# Patient Record
Sex: FEMALE | Race: OTHER | HISPANIC OR LATINO | URBAN - METROPOLITAN AREA
[De-identification: names, ages, dates, MRNs, and addresses within clinical notes are randomized per-mention and may not be internally consistent; named-entity substitution may affect disease eponyms.]

---

## 2024-07-29 ENCOUNTER — EMERGENCY (EMERGENCY)
Facility: HOSPITAL | Age: 24
LOS: 1 days | Discharge: HOME | End: 2024-07-29
Attending: EMERGENCY MEDICINE
Payer: SELF-PAY

## 2024-07-29 VITALS
OXYGEN SATURATION: 100 % | HEART RATE: 74 BPM | DIASTOLIC BLOOD PRESSURE: 73 MMHG | SYSTOLIC BLOOD PRESSURE: 99 MMHG | RESPIRATION RATE: 12 BRPM

## 2024-07-29 VITALS
DIASTOLIC BLOOD PRESSURE: 73 MMHG | HEART RATE: 80 BPM | WEIGHT: 121.25 LBS | OXYGEN SATURATION: 100 % | SYSTOLIC BLOOD PRESSURE: 113 MMHG | TEMPERATURE: 98 F | RESPIRATION RATE: 16 BRPM

## 2024-07-29 LAB
ALBUMIN SERPL ELPH-MCNC: 4.2 G/DL — SIGNIFICANT CHANGE UP (ref 3.5–5)
ALP SERPL-CCNC: 117 U/L — SIGNIFICANT CHANGE UP (ref 40–120)
ALT FLD-CCNC: 34 U/L DA — SIGNIFICANT CHANGE UP (ref 10–60)
AMPHET UR-MCNC: NEGATIVE — SIGNIFICANT CHANGE UP
ANION GAP SERPL CALC-SCNC: 8 MMOL/L — SIGNIFICANT CHANGE UP (ref 5–17)
APAP SERPL-MCNC: <10 UG/ML — SIGNIFICANT CHANGE UP (ref 10–30)
AST SERPL-CCNC: 20 U/L — SIGNIFICANT CHANGE UP (ref 10–40)
BARBITURATES UR SCN-MCNC: NEGATIVE — SIGNIFICANT CHANGE UP
BASE EXCESS BLDV CALC-SCNC: -1.1 MMOL/L — SIGNIFICANT CHANGE UP
BASOPHILS # BLD AUTO: 0.02 K/UL — SIGNIFICANT CHANGE UP (ref 0–0.2)
BASOPHILS NFR BLD AUTO: 0.2 % — SIGNIFICANT CHANGE UP (ref 0–2)
BENZODIAZ UR-MCNC: NEGATIVE — SIGNIFICANT CHANGE UP
BILIRUB SERPL-MCNC: 0.3 MG/DL — SIGNIFICANT CHANGE UP (ref 0.2–1.2)
BLOOD GAS COMMENTS, VENOUS: SIGNIFICANT CHANGE UP
BUN SERPL-MCNC: 8 MG/DL — SIGNIFICANT CHANGE UP (ref 7–18)
CA-I SERPL-SCNC: SIGNIFICANT CHANGE UP MMOL/L (ref 1.15–1.33)
CALCIUM SERPL-MCNC: 9 MG/DL — SIGNIFICANT CHANGE UP (ref 8.4–10.5)
CHLORIDE SERPL-SCNC: 111 MMOL/L — HIGH (ref 96–108)
CO2 SERPL-SCNC: 24 MMOL/L — SIGNIFICANT CHANGE UP (ref 22–31)
COCAINE METAB.OTHER UR-MCNC: NEGATIVE — SIGNIFICANT CHANGE UP
CREAT SERPL-MCNC: 0.65 MG/DL — SIGNIFICANT CHANGE UP (ref 0.5–1.3)
EGFR: 128 ML/MIN/1.73M2 — SIGNIFICANT CHANGE UP
EOSINOPHIL # BLD AUTO: 0.09 K/UL — SIGNIFICANT CHANGE UP (ref 0–0.5)
EOSINOPHIL NFR BLD AUTO: 0.8 % — SIGNIFICANT CHANGE UP (ref 0–6)
ETHANOL SERPL-MCNC: 245 MG/DL — HIGH (ref 0–10)
GAS PNL BLDV: 142 MMOL/L — SIGNIFICANT CHANGE UP (ref 136–145)
GAS PNL BLDV: SIGNIFICANT CHANGE UP
GLUCOSE SERPL-MCNC: 90 MG/DL — SIGNIFICANT CHANGE UP (ref 70–99)
HCG SERPL-ACNC: <1 MIU/ML — SIGNIFICANT CHANGE UP
HCO3 BLDV-SCNC: 25 MMOL/L — SIGNIFICANT CHANGE UP (ref 22–29)
HCT VFR BLD CALC: 35.5 % — SIGNIFICANT CHANGE UP (ref 34.5–45)
HGB BLD-MCNC: 11.9 G/DL — SIGNIFICANT CHANGE UP (ref 11.5–15.5)
IMM GRANULOCYTES NFR BLD AUTO: 0.3 % — SIGNIFICANT CHANGE UP (ref 0–0.9)
LACTATE BLDV-MCNC: 1.8 MMOL/L — SIGNIFICANT CHANGE UP (ref 0.5–2)
LACTATE SERPL-SCNC: 1.7 MMOL/L — SIGNIFICANT CHANGE UP (ref 0.7–2)
LYMPHOCYTES # BLD AUTO: 4.63 K/UL — HIGH (ref 1–3.3)
LYMPHOCYTES # BLD AUTO: 43.6 % — SIGNIFICANT CHANGE UP (ref 13–44)
MCHC RBC-ENTMCNC: 31.3 PG — SIGNIFICANT CHANGE UP (ref 27–34)
MCHC RBC-ENTMCNC: 33.5 GM/DL — SIGNIFICANT CHANGE UP (ref 32–36)
MCV RBC AUTO: 93.4 FL — SIGNIFICANT CHANGE UP (ref 80–100)
METHADONE UR-MCNC: NEGATIVE — SIGNIFICANT CHANGE UP
MONOCYTES # BLD AUTO: 0.52 K/UL — SIGNIFICANT CHANGE UP (ref 0–0.9)
MONOCYTES NFR BLD AUTO: 4.9 % — SIGNIFICANT CHANGE UP (ref 2–14)
NEUTROPHILS # BLD AUTO: 5.32 K/UL — SIGNIFICANT CHANGE UP (ref 1.8–7.4)
NEUTROPHILS NFR BLD AUTO: 50.2 % — SIGNIFICANT CHANGE UP (ref 43–77)
NRBC # BLD: 0 /100 WBCS — SIGNIFICANT CHANGE UP (ref 0–0)
OPIATES UR-MCNC: NEGATIVE — SIGNIFICANT CHANGE UP
PCO2 BLDV: 48 MMHG — HIGH (ref 39–42)
PCP SPEC-MCNC: SIGNIFICANT CHANGE UP
PCP UR-MCNC: NEGATIVE — SIGNIFICANT CHANGE UP
PH BLDV: 7.33 — SIGNIFICANT CHANGE UP (ref 7.32–7.43)
PLATELET # BLD AUTO: 343 K/UL — SIGNIFICANT CHANGE UP (ref 150–400)
PO2 BLDV: 31 MMHG — SIGNIFICANT CHANGE UP
POTASSIUM BLDV-SCNC: 4 MMOL/L — SIGNIFICANT CHANGE UP (ref 3.5–5.1)
POTASSIUM SERPL-MCNC: 3.7 MMOL/L — SIGNIFICANT CHANGE UP (ref 3.5–5.3)
POTASSIUM SERPL-SCNC: 3.7 MMOL/L — SIGNIFICANT CHANGE UP (ref 3.5–5.3)
PROT SERPL-MCNC: 7.9 G/DL — SIGNIFICANT CHANGE UP (ref 6–8.3)
RBC # BLD: 3.8 M/UL — SIGNIFICANT CHANGE UP (ref 3.8–5.2)
RBC # FLD: 12 % — SIGNIFICANT CHANGE UP (ref 10.3–14.5)
SALICYLATES SERPL-MCNC: <1.7 MG/DL — LOW (ref 2.8–20)
SAO2 % BLDV: 47.5 % — SIGNIFICANT CHANGE UP
SARS-COV-2 RNA SPEC QL NAA+PROBE: SIGNIFICANT CHANGE UP
SODIUM SERPL-SCNC: 143 MMOL/L — SIGNIFICANT CHANGE UP (ref 135–145)
THC UR QL: NEGATIVE — SIGNIFICANT CHANGE UP
WBC # BLD: 10.61 K/UL — HIGH (ref 3.8–10.5)
WBC # FLD AUTO: 10.61 K/UL — HIGH (ref 3.8–10.5)

## 2024-07-29 PROCEDURE — 70450 CT HEAD/BRAIN W/O DYE: CPT | Mod: MC

## 2024-07-29 PROCEDURE — 80307 DRUG TEST PRSMV CHEM ANLYZR: CPT

## 2024-07-29 PROCEDURE — 71045 X-RAY EXAM CHEST 1 VIEW: CPT

## 2024-07-29 PROCEDURE — 93005 ELECTROCARDIOGRAM TRACING: CPT

## 2024-07-29 PROCEDURE — 71045 X-RAY EXAM CHEST 1 VIEW: CPT | Mod: 26

## 2024-07-29 PROCEDURE — 93010 ELECTROCARDIOGRAM REPORT: CPT

## 2024-07-29 PROCEDURE — 85025 COMPLETE CBC W/AUTO DIFF WBC: CPT

## 2024-07-29 PROCEDURE — 70450 CT HEAD/BRAIN W/O DYE: CPT | Mod: 26,MC

## 2024-07-29 PROCEDURE — 84702 CHORIONIC GONADOTROPIN TEST: CPT

## 2024-07-29 PROCEDURE — 99285 EMERGENCY DEPT VISIT HI MDM: CPT | Mod: 25

## 2024-07-29 PROCEDURE — 99053 MED SERV 10PM-8AM 24 HR FAC: CPT

## 2024-07-29 PROCEDURE — 84132 ASSAY OF SERUM POTASSIUM: CPT

## 2024-07-29 PROCEDURE — 99285 EMERGENCY DEPT VISIT HI MDM: CPT

## 2024-07-29 PROCEDURE — 82330 ASSAY OF CALCIUM: CPT

## 2024-07-29 PROCEDURE — 83605 ASSAY OF LACTIC ACID: CPT

## 2024-07-29 PROCEDURE — 80053 COMPREHEN METABOLIC PANEL: CPT

## 2024-07-29 PROCEDURE — 82803 BLOOD GASES ANY COMBINATION: CPT

## 2024-07-29 PROCEDURE — 96374 THER/PROPH/DIAG INJ IV PUSH: CPT

## 2024-07-29 PROCEDURE — 82962 GLUCOSE BLOOD TEST: CPT

## 2024-07-29 PROCEDURE — 84295 ASSAY OF SERUM SODIUM: CPT

## 2024-07-29 PROCEDURE — 36415 COLL VENOUS BLD VENIPUNCTURE: CPT

## 2024-07-29 PROCEDURE — 72125 CT NECK SPINE W/O DYE: CPT | Mod: 26,MC

## 2024-07-29 PROCEDURE — 72125 CT NECK SPINE W/O DYE: CPT | Mod: MC

## 2024-07-29 PROCEDURE — 87635 SARS-COV-2 COVID-19 AMP PRB: CPT

## 2024-07-29 RX ORDER — SODIUM CHLORIDE 0.9 % (FLUSH) 0.9 %
1000 SYRINGE (ML) INJECTION ONCE
Refills: 0 | Status: COMPLETED | OUTPATIENT
Start: 2024-07-29 | End: 2024-07-29

## 2024-07-29 RX ORDER — NALOXONE HYDROCHLORIDE 1 MG/ML
0.04 INJECTION PARENTERAL ONCE
Refills: 0 | Status: COMPLETED | OUTPATIENT
Start: 2024-07-29 | End: 2024-07-29

## 2024-07-29 RX ADMIN — Medication 1000 MILLILITER(S): at 05:45

## 2024-07-29 RX ADMIN — Medication 1000 MILLILITER(S): at 01:20

## 2024-07-29 RX ADMIN — Medication 1000 MILLILITER(S): at 02:20

## 2024-07-29 RX ADMIN — Medication 1000 MILLILITER(S): at 04:10

## 2024-07-29 RX ADMIN — Medication 1000 MILLILITER(S): at 04:54

## 2024-07-29 RX ADMIN — NALOXONE HYDROCHLORIDE 0.04 MILLIGRAM(S): 1 INJECTION PARENTERAL at 01:20

## 2024-07-29 RX ADMIN — Medication 1000 MILLILITER(S): at 03:10

## 2024-07-29 NOTE — ED ADULT NURSE NOTE - TEMPLATE
General FAMILY HISTORY:  No pertinent family history in first degree relatives  No pertinent family history in first degree relatives

## 2024-07-29 NOTE — ED PROVIDER NOTE - CLINICAL SUMMARY MEDICAL DECISION MAKING FREE TEXT BOX
24-year-old female presenting with unresponsiveness.  Intact respirations with no evidence of tongue biting, urinary incontinence or significant external trauma or drug abuse.  Plan to perform CT imaging of head, C-spine, check labs including alcohol level, tox screen and reassess. 24-year-old female presenting with unresponsiveness.  Intact respirations with no evidence of tongue biting, urinary incontinence or significant external trauma or drug abuse.  Symptoms may suggest excessive alcohol consumption, seizure, drug use among other causes.  Plan to perform CT imaging of head, C-spine, check labs including alcohol level, tox screen and reassess.

## 2024-07-29 NOTE — ED PROVIDER NOTE - PATIENT PORTAL LINK FT
You can access the FollowMyHealth Patient Portal offered by [facility] by registering at the following website: [email or web address]. By joining [facility]’s FollowMyHealth portal, you will also be able to view your health information using other applications (apps) compatible with our system.

## 2024-07-29 NOTE — ED PROVIDER NOTE - PROGRESS NOTE DETAILS
Name band;
Patient is fully awake and alert.  Ambulatory without difficulty.  Admits to drinking several beers at a bar.  States that she was picked up by her boyfriend and brother after exiting bar because individuals were following her.  States that she was not assaulted.  States that she was not raped.  Denies having any history of seizures.  Recalls going home but does not remember anything after taking a shower.  States she would like to go home.

## 2024-07-29 NOTE — ED ADULT NURSE NOTE - OBJECTIVE STATEMENT
Pt present to the ED along with brother with unresponsive with foaming  . Provider at bedside . Pt was drinking alcohol at bar earlier . Placed on cardio respiratory monitor .

## 2024-07-29 NOTE — ED PROVIDER NOTE - NSFOLLOWUPINSTRUCTIONS_ED_ALL_ED_FT
Abuso de alcohol    LO QUE NECESITA SABER:    ¿Qué es el abuso de alcohol?El abuso de alcohol significa que usted sam más de los límites diarios o semanales recomendados. Usted puede estar bebiendo alcohol regularmente o bebiendo grandes cantidades en un corto período (atracones de bebida). Usted sigue tomando, aunque esto le cause problemas legales, laborales o con edilberto relaciones.    ¿Qué necesito saber acerca de los límites recomendados de alcohol?Alka bebida se define patsy 12 onzas (oz) de cerveza, 5 onzas de vino o 1.5 onzas de licor, patsy el whisky.    Los hombres de 21 a 64 añosdeberían limitar el consumo de alcohol a 2 tragos al día. No tome más de 4 bebidas por día o más de 14 en alka semana.    Todos los hombres y las mujeres de 65 años o másdeberían limitar el consumo de alcohol a 1 trago por día. No tome más de 3 bebidas por día o más de 7 en alka semana. No consuma bebidas alcohólicas si está embarazada.  ¿Cuáles son los signos y síntomas del abuso de alcohol?    Pérdida de interés en edilberto actividades, trabajo y labor escolar    Esconder alcohol o beber en privado    Depresión o sentimiento de culpa por beber    Pensamientos constantes acerca del alcohol    Beber por la mañana para aliviar los efectos de la resaca    No poder controlar la cantidad que se monty    Inquietud, comportamiento errático y violento  ¿Qué problemas de mike puede causar el abuso del alcohol?    Cáncer en hígado, páncreas, estómago, colon, riñón o mamas    Derrame cerebral o ataque cardíaco    Enfermedad hepática, renal o pulmonar    Desmayos, pérdida de memoria, daño cerebral o demencia    Diabetes, problemas del sistema inmunológico o deficiencia de tiamina (vitamina B1)    Problemas para usted y camacho bebé si sam ashok el embarazo  ¿Cómo se trata el abuso del alcohol?El tratamiento lo va a ayudar a comprender la razón por la cual usted abusa del alcohol. Los consejeros y terapeutas le van a proveer apoyo y lo van a ayudar a encontrar formas de afrontamiento en vez de benitez. Usted podría necesitar tratamiento con internación para proveerle un ambiente controlado. Usted podría necesitar tratamiento ambulatorio después de que camacho tratamiento hospitalario haya sido completado.    La desintoxicaciónes un programa utilizado para eliminar el alcohol de camacho cuerpo. Ashok la desintoxicación se administran medicamentos para ayudar a evitar los síntomas de abstinencia que se presentan cuando se suspende el consumo de alcohol.    La terapia de intervención brevelo ayuda a pensar de manera diferente sobre camacho consumo de alcohol. Un médico lo ayuda a fijar metas para disminuir camacho consumo de bebidas alcohólicas. La terapia puede continuar después de que sale del hospital.    Suplementos vitamínicos, patsy B1, pueden ser necesarios. El consumo de alcohol puede dificultar la capacidad del organismo de absorber suficiente vitamina B1. Es posible que le den vitamina B1 si camacho nivel es bajo. También se administra para prevenir el daño cerebral por consumo de alcohol.  ¿Qué puedo hacer para manejar mi abuso de alcohol?    Trabaje con los médicos en los objetivos para beber menos.[place] puede ayudar a prevenir problemas de mike. Por ejemplo, puede empezar por planificar camacho consumo semanal de alcohol. Será más fácil benitez menos bebidas si planifica con antelación.    Cuando ryan alcohol, acompáñelo con comida.La comida evitará que el alcohol entre en camacho sistema demasiado rápido. Coma antes de benitez camacho primera bebida alcohólica.    Calcule con cuidado el tiempo de edilberto bebidas.No tome más de alka bebida por hora. [place] líquido, patsy agua, café o un refresco, entre las bebidas alcohólicas.    No maneje si ha tomado alcohol.Planifique con antelación para tener un viaje seguro a casa. Asegúrese de que alguien que no haya estado bebiendo lo pueda llevar a casa. Planifique el uso de un taxi u otro servicio de transporte, si lo necesita.    No ryan alcohol si está tomando froy medicamento.El alcohol es peligroso cuando se combina con ciertos medicamentos, patsy acetaminofeno o un medicamento para la presión arterial. Hable con camacho médico acerca de todos los medicamentos que está tomando.  ¿Dónde puedo obtener apoyo y más información?    Alcoholics Anonymous  Web Address: http://www.aa.org  Substance Abuse and Mental Health Services Administration ([facility])  [address]  [place] [address]  Web Address: http://www.samhsa.gov or https://dpt2.samhsa.gov/treatment/  Llame al número local de emergencias (911 en los [place]), o pídale a alguien que llame si:    Tiene dolor en el pecho o dificultad para respirar de forma repentina.    Usted quiere lastimarse o lastimar a otros.    Usted sufre alka convulsión.  ¿Cuándo ronald buscar atención inmediatamente?    Usted no puede dejar de vomitar o vomita simón.    ¿Cuándo ronald llamar a mi médico?    Usted tiene alucinaciones (ve o escucha cosas que no son reales).    Usted tiene preguntas o inquietudes acerca de camacho condición o cuidado.  ACUERDOS SOBRE CAMACHO CUIDADO:    Usted tiene el derecho de ayudar a planear camacho cuidado. Aprenda todo lo que pueda sobre camacho condición y patsy darle tratamiento. Discuta edilberto opciones de tratamiento con edilberto médicos para decidir el cuidado que usted desea recibir. Usted siempre tiene el derecho de rechazar el tratamiento.    © [employer] 1973, 2024    	  back to top

## 2024-07-29 NOTE — ED PROVIDER NOTE - OBJECTIVE STATEMENT
24-year-old female presenting unresponsive brought in by brother and boyfriend.  Patient was drinking at a bar and called them crying and distressed.  They came and picked her up took her home.  She took a bath and afterwards she became unresponsive, foaming at the mouth.